# Patient Record
Sex: FEMALE | URBAN - METROPOLITAN AREA
[De-identification: names, ages, dates, MRNs, and addresses within clinical notes are randomized per-mention and may not be internally consistent; named-entity substitution may affect disease eponyms.]

---

## 2018-08-08 ENCOUNTER — APPOINTMENT (RX ONLY)
Dept: URBAN - METROPOLITAN AREA CLINIC 323 | Facility: CLINIC | Age: 23
Setting detail: DERMATOLOGY
End: 2018-08-08

## 2018-08-08 DIAGNOSIS — L30.1 DYSHIDROSIS [POMPHOLYX]: ICD-10-CM

## 2018-08-08 PROBLEM — L85.3 XEROSIS CUTIS: Status: ACTIVE | Noted: 2018-08-08

## 2018-08-08 PROCEDURE — ? PRESCRIPTION

## 2018-08-08 PROCEDURE — ? ADDITIONAL NOTES

## 2018-08-08 PROCEDURE — 99202 OFFICE O/P NEW SF 15 MIN: CPT

## 2018-08-08 PROCEDURE — ? COUNSELING

## 2018-08-08 RX ORDER — FLUOCINONIDE 0.5 MG/ML
OINTMENT TOPICAL
Qty: 1 | Refills: 2 | Status: ERX | COMMUNITY
Start: 2018-08-08

## 2018-08-08 RX ADMIN — FLUOCINONIDE: 0.5 OINTMENT TOPICAL at 16:40

## 2018-08-08 NOTE — PROCEDURE: ADDITIONAL NOTES
Additional Notes: INTERMITTENT TINY BLISTERS ON HANDS AND FEET FOR THE PAST 2+ YEARS.  NO HX OF CHILDHOOD ECZEMA.  NONE TODAY BUT PT HAS PHOTOS ON PHONE.  APPEARANCE FAVORS POMPHYLOX.  DISCUSSED AT LENGTH.  PRN TOPICAL ROID AND MOISTURIZE DAILY.  CHRONIC NATURE OF CONDITION WAS REVIEWED.